# Patient Record
Sex: MALE | Race: ASIAN
[De-identification: names, ages, dates, MRNs, and addresses within clinical notes are randomized per-mention and may not be internally consistent; named-entity substitution may affect disease eponyms.]

---

## 2017-04-12 DIAGNOSIS — J45.20 COLD-INDUCED ASTHMA, MILD INTERMITTENT, UNCOMPLICATED: ICD-10-CM

## 2017-04-12 RX ORDER — ALBUTEROL SULFATE 90 UG/1
2 AEROSOL, METERED RESPIRATORY (INHALATION) EVERY 6 HOURS
Qty: 1 INHALER | Refills: 0 | Status: SHIPPED | OUTPATIENT
Start: 2017-04-12 | End: 2019-02-27 | Stop reason: SDUPTHER

## 2018-07-16 ENCOUNTER — HOSPITAL ENCOUNTER (EMERGENCY)
Dept: HOSPITAL 8 - ED | Age: 73
Discharge: HOME | End: 2018-07-16
Payer: MEDICARE

## 2018-07-16 VITALS — WEIGHT: 130.73 LBS | BODY MASS INDEX: 24.68 KG/M2 | HEIGHT: 61 IN

## 2018-07-16 VITALS — DIASTOLIC BLOOD PRESSURE: 78 MMHG | SYSTOLIC BLOOD PRESSURE: 183 MMHG

## 2018-07-16 DIAGNOSIS — M79.672: Primary | ICD-10-CM

## 2018-07-16 DIAGNOSIS — M10.072: ICD-10-CM

## 2018-07-16 PROCEDURE — 99283 EMERGENCY DEPT VISIT LOW MDM: CPT

## 2018-07-16 PROCEDURE — 96372 THER/PROPH/DIAG INJ SC/IM: CPT

## 2019-02-27 ENCOUNTER — OFFICE VISIT (OUTPATIENT)
Dept: INTERNAL MEDICINE | Facility: MEDICAL CENTER | Age: 74
End: 2019-02-27
Payer: COMMERCIAL

## 2019-02-27 VITALS
SYSTOLIC BLOOD PRESSURE: 151 MMHG | HEIGHT: 62 IN | BODY MASS INDEX: 25.43 KG/M2 | OXYGEN SATURATION: 95 % | HEART RATE: 86 BPM | DIASTOLIC BLOOD PRESSURE: 78 MMHG | TEMPERATURE: 99.5 F | WEIGHT: 138.2 LBS

## 2019-02-27 DIAGNOSIS — Z00.00 ROUTINE HEALTH MAINTENANCE: ICD-10-CM

## 2019-02-27 DIAGNOSIS — J45.20 COLD-INDUCED ASTHMA, MILD INTERMITTENT, UNCOMPLICATED: ICD-10-CM

## 2019-02-27 DIAGNOSIS — R73.02 IGT (IMPAIRED GLUCOSE TOLERANCE): ICD-10-CM

## 2019-02-27 DIAGNOSIS — M1A.9XX0 CHRONIC GOUT WITHOUT TOPHUS, UNSPECIFIED CAUSE, UNSPECIFIED SITE: ICD-10-CM

## 2019-02-27 DIAGNOSIS — R03.0 ELEVATED BP WITHOUT DIAGNOSIS OF HYPERTENSION: ICD-10-CM

## 2019-02-27 PROCEDURE — 99204 OFFICE O/P NEW MOD 45 MIN: CPT | Mod: GC | Performed by: INTERNAL MEDICINE

## 2019-02-27 RX ORDER — ALBUTEROL SULFATE 90 UG/1
2 AEROSOL, METERED RESPIRATORY (INHALATION) EVERY 6 HOURS
Qty: 1 INHALER | Refills: 3 | Status: SHIPPED | OUTPATIENT
Start: 2019-02-27

## 2019-02-27 RX ORDER — ALLOPURINOL 300 MG/1
300 TABLET ORAL
Qty: 180 TAB | Refills: 1 | Status: SHIPPED | OUTPATIENT
Start: 2019-02-27

## 2019-02-27 RX ORDER — INDOMETHACIN 50 MG/1
50 CAPSULE ORAL 3 TIMES DAILY PRN
Qty: 60 CAP | Refills: 1 | Status: SHIPPED | OUTPATIENT
Start: 2019-02-27

## 2019-02-27 ASSESSMENT — ENCOUNTER SYMPTOMS
SHORTNESS OF BREATH: 1
BLURRED VISION: 0
DOUBLE VISION: 0
SORE THROAT: 0
NAUSEA: 0
SPUTUM PRODUCTION: 0
HEADACHES: 0
WEAKNESS: 0
FOCAL WEAKNESS: 0
COUGH: 1
NERVOUS/ANXIOUS: 0
DIZZINESS: 0
FEVER: 0
DEPRESSION: 0
PALPITATIONS: 0
MYALGIAS: 0
VOMITING: 0
ABDOMINAL PAIN: 0
CHILLS: 0

## 2019-02-27 ASSESSMENT — PATIENT HEALTH QUESTIONNAIRE - PHQ9: CLINICAL INTERPRETATION OF PHQ2 SCORE: 0

## 2019-02-27 NOTE — PROGRESS NOTES
New Patient to Establish    Reason to establish: New patient to establish    CC:   Establish care, medications for gout and asthma      HPI:  73-year-old male with a past medical history of gout, asthma who presents today to establish care.  Patient does complain of nonproductive cough and associated shortness of breath.  Patient states that the symptoms have started since he has been out of his albuterol.  He has been out of his albuterol for approximately 4 months.  States he used to use albuterol 2-3 times a week for symptoms.  States that he currently wakes up at night 3-4 times a week coughing and short of breath.  Patient also complains of an acute gout flare in September 2018.  Patient states that he had extreme pain of his left great toe and visited [facility] emergency department.  States he was treated with indomethacin and allopurinol.  Patient has been taking allopurinol since 2010 for his gout and is currently almost on medication.  Patient is requesting extra refills as he is planning to go to [facility] in May.      Preventive care  Colonoscopy - 2011      Patient Active Problem List    Diagnosis Date Noted   • IGT (impaired glucose tolerance) 02/23/2015   • Gout, arthritis 11/26/2013   • Bronchospasm, acute        Past Medical History:   Diagnosis Date   • Bronchospasm, acute     Due to cold weather.  Rare albuterol use   • Cold-induced asthma    • GOUT 2003       Current Outpatient Prescriptions   Medication Sig Dispense Refill   • albuterol (VENTOLIN HFA) 108 (90 Base) MCG/ACT Aero Soln inhalation aerosol Inhale 2 Puffs by mouth every 6 hours. INHALE 2 PUFFS BY MOUTH EVERY 6 HOURS AS NEEDED FOR SHORTNESS OF BREATH. 1 Inhaler 3   • allopurinol (ZYLOPRIM) 300 MG Tab Take 1 Tab by mouth every day. 180 Tab 1   • indomethacin (INDOCIN) 50 MG Cap Take 1 Cap by mouth 3 times a day as needed. 60 Cap 1     No current facility-administered medications for this visit.        Allergies as of 02/27/2019   •  "(No Known Allergies)       Social History     Social History   • Marital status:      Spouse name: N/A   • Number of children: N/A   • Years of education: N/A     Occupational History   • Retired Other     Social History Main Topics   • Smoking status: Former Smoker     Packs/day: 0.25     Years: 40.00     Types: Cigarettes   • Smokeless tobacco: Former User     Quit date: 1/1/2010   • Alcohol use 0.5 oz/week     1 Cans of beer per week      Comment: social    • Drug use: No   • Sexual activity: Yes     Partners: Female     Other Topics Concern   • Not on file     Social History Narrative   • No narrative on file       Family History   Problem Relation Age of Onset   • Stroke Brother    • Hypertension Brother        History reviewed. No pertinent surgical history.    ROS: As per HPI. Additional pertinent symptoms as noted below.    Review of Systems   Constitutional: Negative for chills and fever.   HENT: Negative for congestion, hearing loss and sore throat.    Eyes: Negative for blurred vision and double vision.   Respiratory: Positive for cough and shortness of breath. Negative for sputum production.    Cardiovascular: Negative for chest pain, palpitations and leg swelling.   Gastrointestinal: Negative for abdominal pain, nausea and vomiting.   Genitourinary: Negative for dysuria and urgency.   Musculoskeletal: Negative for joint pain and myalgias.   Skin: Negative for itching and rash.   Neurological: Negative for dizziness, focal weakness, weakness and headaches.   Psychiatric/Behavioral: Negative for depression. The patient is not nervous/anxious.        Physical Exam    /78 (BP Location: Left arm, Patient Position: Sitting)   Pulse 86   Temp 37.5 °C (99.5 °F) (Temporal)   Ht 1.575 m (5' 2\")   Wt 62.7 kg (138 lb 3.2 oz)   SpO2 95%   BMI 25.28 kg/m²     Physical Exam   Constitutional: He is oriented to person, place, and time and well-developed, well-nourished, and in no distress.   HENT: "   Head: Normocephalic and atraumatic.   Mouth/Throat: No oropharyngeal exudate.   Eyes: Pupils are equal, round, and reactive to light. Conjunctivae are normal. No scleral icterus.   Neck: Normal range of motion. Neck supple. No JVD present. No thyromegaly present.   Cardiovascular: Normal rate and regular rhythm.    No murmur heard.  Pulmonary/Chest: Effort normal and breath sounds normal. No respiratory distress. He has no wheezes.   Abdominal: Soft. Bowel sounds are normal. He exhibits no distension. There is no tenderness. There is no rebound.   Musculoskeletal: Normal range of motion. He exhibits no edema.   Neurological: He is alert and oriented to person, place, and time. No cranial nerve deficit.   Skin: No rash noted. No erythema.   Psychiatric: Affect normal.         Assessment and Plan    1. Cold-induced asthma, mild intermittent  -Patient complains shortness of breath, nonproductive cough, nighttime awakenings previously controlled with albuterol  -Has been out of medication for 4 months with increasing symptoms  -We will refill medications at this time and assess for improvement/resolution of symptoms  - albuterol (VENTOLIN HFA) 108 (90 Base) MCG/ACT Aero Soln inhalation aerosol; Inhale 2 Puffs by mouth every 6 hours. INHALE 2 PUFFS BY MOUTH EVERY 6 HOURS AS NEEDED FOR SHORTNESS OF BREATH.  Dispense: 1 Inhaler; Refill: 3       2. Chronic gout without tophus, unspecified cause, unspecified site  -Patient complains of chronic gout, last flare in September 2018  -Uses allopurinol for prophylaxis, indomethacin for acute flare  -We will refill medications at this time and obtain serum uric acid  - URIC ACID, SERUM  - allopurinol (ZYLOPRIM) 300 MG Tab; Take 1 Tab by mouth every day. Dispense: 180 Tab  - indomethacin (INDOCIN) 50 MG Cap; Take 1 Cap by mouth 3 times a day as needed.       3. IGT (impaired glucose tolerance)  -History of impaired fasting glucose, no recent labs  -We will repeat fasting blood  glucose, A1c at this time  - HEMOGLOBIN A1C; Future      4. Elevated BP without diagnosis of hypertension  -Blood pressure 151/78 in clinic today, no history of hypertension  -We will continue to monitor and check blood pressures      5. Routine health maintenance  - CBC WITH DIFFERENTIAL; Future  - Comp Metabolic Panel; Future  - Lipid Profile; Future  - MICROALBUMIN CREAT RATIO URINE; Future  - URINALYSIS; Future  - TSH WITH REFLEX TO FT4; Future      Followup: Return in about 10 weeks (around 5/8/2019).      Signed by: Ksenia Keen M.D.

## 2019-02-27 NOTE — PATIENT INSTRUCTIONS
Asthma Prevention  Cigarette smoke, house dust, molds, pollens, animal dander, certain insects, exercise, and even cold air are all triggers that can cause an asthma attack. Often, no specific triggers are identified.   Take the following measures around your house to reduce attacks:  · Avoid cigarette and other smoke. No smoking should be allowed in a home where someone with asthma lives. If smoking is allowed indoors, it should be done in a room with a closed door, and a window should be opened to clear the air. If possible, do not use a wood-burning stove, kerosene heater, or fireplace. Minimize exposure to all sources of smoke, including incense, candles, fires, and fireworks.  · Decrease pollen exposure. Keep your windows shut and use central air during the pollen allergy season. Stay indoors with windows closed from late morning to afternoon, if you can. Avoid mowing the lawn if you have grass pollen allergy. Change your clothes and shower after being outside during this time of year.  · Remove molds from bathrooms and wet areas. Do this by cleaning the floors with a fungicide or diluted bleach. Avoid using humidifiers, vaporizers, or swamp coolers. These can spread molds through the air. Fix leaky faucets, pipes, or other sources of water that have mold around them.  · Decrease house dust exposure. Do this by using bare floors, vacuuming frequently, and changing furnace and air cooler filters frequently. Avoid using feather, wool, or foam bedding. Use polyester pillows and plastic covers over your mattress. Wash bedding weekly in hot water (hotter than 130° F).  · Try to get someone else to vacuum for you once or twice a week, if you can. Stay out of rooms while they are being vacuumed and for a short while afterward. If you vacuum, use a dust mask (from a hardware store), a double-layered or microfilter vacuum  bag, or a vacuum  with a HEPA filter.  · Avoid perfumes, talcum powder, hair spray,  paints and other strong odors and fumes.  · Keep warm-blooded pets (cats, dogs, rodents, birds) outside the home if they are triggers for asthma. If you can't keep the pet outdoors, keep the pet out of your bedroom and other sleeping areas at all times, and keep the door closed. Remove carpets and furniture covered with cloth from your home. If that is not possible, keep the pet away from fabric-covered furniture and carpets.  · Eliminate cockroaches. Keep food and garbage in closed containers. Never leave food out. Use poison baits, traps, powders, gels, or paste (for example, boric acid). If a spray is used to kill cockroaches, stay out of the room until the odor goes away.  · Decrease indoor humidity to less than 60%. Use an indoor air cleaning device.  · Avoid sulfites in foods and beverages. Do not drink beer or wine or eat dried fruit, processed potatoes, or shrimp if they cause asthma symptoms.  · Avoid cold air. Cover your nose and mouth with a scarf on cold or windy days.  · Avoid aspirin. This is the most common drug causing serious asthma attacks.  · If exercise triggers your asthma, ask your caregiver how you should prepare before exercising. (For example, ask if you could use your inhaler 10 minutes before exercising.)  · Avoid close contact with people who have a cold or the flu since your asthma symptoms may get worse if you catch the infection from them. Wash your hands thoroughly after touching items that may have been handled by others with a respiratory infection.  · Get a flu shot every year to protect against the flu virus, which often makes asthma worse for days to weeks. Also get a pneumonia shot once every five to 10 years.  Call your caregiver if you want further information about measures you can take to help prevent asthma attacks.  Document Released: 12/18/2006 Document Revised: 03/11/2013 Document Reviewed: 10/26/2010  [employer]Care® Patient Information ©2014 [employer].

## 2019-05-08 ENCOUNTER — OFFICE VISIT (OUTPATIENT)
Dept: INTERNAL MEDICINE | Facility: MEDICAL CENTER | Age: 74
End: 2019-05-08
Payer: COMMERCIAL

## 2019-05-08 VITALS
BODY MASS INDEX: 25.32 KG/M2 | HEART RATE: 80 BPM | OXYGEN SATURATION: 96 % | WEIGHT: 137.6 LBS | HEIGHT: 62 IN | TEMPERATURE: 99.5 F | SYSTOLIC BLOOD PRESSURE: 155 MMHG | DIASTOLIC BLOOD PRESSURE: 85 MMHG

## 2019-05-08 DIAGNOSIS — I10 ESSENTIAL HYPERTENSION: ICD-10-CM

## 2019-05-08 DIAGNOSIS — J45.20 MILD INTERMITTENT ASTHMA WITHOUT COMPLICATION: ICD-10-CM

## 2019-05-08 DIAGNOSIS — R73.03 PREDIABETES: ICD-10-CM

## 2019-05-08 DIAGNOSIS — Z23 NEED FOR VACCINATION AGAINST STREPTOCOCCUS PNEUMONIAE USING PNEUMOCOCCAL CONJUGATE VACCINE 13: ICD-10-CM

## 2019-05-08 PROCEDURE — 99213 OFFICE O/P EST LOW 20 MIN: CPT | Mod: GE,25 | Performed by: INTERNAL MEDICINE

## 2019-05-08 PROCEDURE — 90471 IMMUNIZATION ADMIN: CPT | Performed by: INTERNAL MEDICINE

## 2019-05-08 PROCEDURE — 90670 PCV13 VACCINE IM: CPT | Performed by: INTERNAL MEDICINE

## 2019-05-08 RX ORDER — LOSARTAN POTASSIUM 25 MG/1
25 TABLET ORAL DAILY
Qty: 90 TAB | Refills: 1 | Status: SHIPPED | OUTPATIENT
Start: 2019-05-08

## 2019-05-08 ASSESSMENT — ENCOUNTER SYMPTOMS
NAUSEA: 0
ABDOMINAL PAIN: 0
DOUBLE VISION: 0
DEPRESSION: 0
DIZZINESS: 0
VOMITING: 0
CHILLS: 0
BLURRED VISION: 0
WHEEZING: 0
MYALGIAS: 0
WEAKNESS: 0
FEVER: 0
HEADACHES: 0
SORE THROAT: 0
COUGH: 0
SHORTNESS OF BREATH: 0
PND: 0

## 2019-05-08 ASSESSMENT — LIFESTYLE VARIABLES: SUBSTANCE_ABUSE: 0

## 2019-05-08 NOTE — NON-PROVIDER
"Octaviano Latif is a 73 y.o. male here for a non-provider visit for:   PREVNAR (PCV13) 1 of 1    Reason for immunization: Overdue/Provider Recommended  Immunization records indicate need for vaccine: Yes, confirmed with Epic  Minimum interval has been met for this vaccine: Yes  ABN completed: Not Indicated    Order and dose verified by: Shira ROMERO Dated  11/5/15 was given to patient: Yes  All IAC Questionnaire questions were answered \"No.\"    Patient tolerated injection and no adverse effects were observed or reported: Yes    Pt scheduled for next dose in series: Not Indicated  "

## 2019-05-08 NOTE — PROGRESS NOTES
Established Patient    Octaviano presents today with the following:    CC:   Follow-up for labs, pneumonia vaccine      HPI:   73-year-old male past medical history of recently diagnosed prediabetes (last A1c 6.2 in 03/2019), asthma presents today for a follow-up.  Patient states he is doing well since last visit with no acute complaints.  Denies shortness of breath or asthma exacerbations.  States he uses albuterol 1-2 times a week.  Patient has not checked his blood pressure since last visit but obtained a blood pressure cuff.  Denies any symptoms including headaches, dizziness, shortness of breath, chest pain with exertion.  Patient does plan to go to [facility] in a few weeks for 3 to 4 months.  He is also requesting a pneumonia vaccine at this time.      Patient Active Problem List    Diagnosis Date Noted   • Essential hypertension 05/08/2019   • Mild intermittent asthma without complication 05/08/2019   • Prediabetes 02/23/2015   • Gout, arthritis 11/26/2013       Current Outpatient Prescriptions   Medication Sig Dispense Refill   • allopurinol (ZYLOPRIM) 300 MG Tab Take 1 Tab by mouth every day. 180 Tab 1   • indomethacin (INDOCIN) 50 MG Cap Take 1 Cap by mouth 3 times a day as needed. 60 Cap 1   • albuterol (VENTOLIN HFA) 108 (90 Base) MCG/ACT Aero Soln inhalation aerosol Inhale 2 Puffs by mouth every 6 hours. INHALE 2 PUFFS BY MOUTH EVERY 6 HOURS AS NEEDED FOR SHORTNESS OF BREATH. 1 Inhaler 3     No current facility-administered medications for this visit.        ROS: As per HPI. Additional pertinent symptoms as noted below.  Review of Systems   Constitutional: Negative for chills and fever.   HENT: Negative for hearing loss, sore throat and tinnitus.    Eyes: Negative for blurred vision and double vision.   Respiratory: Negative for cough, shortness of breath and wheezing.    Cardiovascular: Negative for chest pain, leg swelling and PND.   Gastrointestinal: Negative for abdominal pain, nausea and vomiting.  "  Genitourinary: Negative for dysuria and urgency.   Musculoskeletal: Negative for joint pain and myalgias.   Skin: Negative for itching and rash.   Neurological: Negative for dizziness, weakness and headaches.   Psychiatric/Behavioral: Negative for depression and substance abuse.         Physical Exam    /85 (BP Location: Left arm, Patient Position: Sitting)   Pulse 80   Temp 37.5 °C (99.5 °F) (Temporal)   Ht 1.575 m (5' 2\")   Wt 62.4 kg (137 lb 9.6 oz)   SpO2 96%   BMI 25.17 kg/m²     Physical Exam   Constitutional: He is oriented to person, place, and time and well-developed, well-nourished, and in no distress.   HENT:   Head: Normocephalic and atraumatic.   Mouth/Throat: No oropharyngeal exudate.   Eyes: Pupils are equal, round, and reactive to light. Conjunctivae are normal. No scleral icterus.   Neck: Normal range of motion. Neck supple. No JVD present. No thyromegaly present.   Cardiovascular: Normal rate, regular rhythm and normal heart sounds.    Pulmonary/Chest: Effort normal and breath sounds normal. No respiratory distress. He has no wheezes. He has no rales.   Abdominal: Soft. Bowel sounds are normal. He exhibits no distension. There is no tenderness. There is no rebound.   Musculoskeletal: Normal range of motion. He exhibits no edema.   Neurological: He is alert and oriented to person, place, and time. No cranial nerve deficit.   Skin: Skin is dry. No rash noted. No erythema.   Psychiatric: Affect normal.         Assessment and Plan    1. Prediabetes  -History of recently diagnosed prediabetes, stable, not on any medications  -A1c of 6.2 in February 2019  -Educated patient regarding lifestyle and dietary modification, avoidance of sodas, reduction of sugar in tea, avoiding sweets  -We will hold pharmacotherapy at this time and check A1c again when returns from the [facility]      2. Essential hypertension  -Blood pressure of 155/82 in clinic today, elevated at previous visit  -Does not " check blood pressure at home  -Discussed with patient, patient amenable to starting pharmacotherapy at this time, will start ARB  - losartan (COZAAR) 25 MG Tab; Take 1 Tab by mouth every day.  Dispense: 90 Tab; Refill: 1      3. Mild intermittent asthma without complication  -Stable  -Continue albuterol as needed      4. Need for vaccination against Streptococcus pneumoniae using pneumococcal conjugate vaccine 13  -Has not received previous pneumonia vaccine  - Prevnar 13 PCV-13      Follow up: Return in about 5 months (around 10/8/2019).      Signed by: Ksenia Keen M.D.

## 2019-05-08 NOTE — PATIENT INSTRUCTIONS
"How to Take Your Blood Pressure  HOW DO I GET A BLOOD PRESSURE MACHINE?  · You can buy an electronic home blood pressure machine at your local pharmacy. Insurance will sometimes cover the cost if you have a prescription.  · Ask your doctor what type of machine is best for you. There are different machines for your arm and your wrist.  · If you decide to buy a machine to check your blood pressure on your arm, first check the size of your arm so you can buy the right size cuff. To check the size of your arm:    ¨ Use a measuring tape that shows both inches and centimeters.    ¨ Wrap the measuring tape around the upper-middle part of your arm. You may need someone to help you measure.    ¨ Write down your arm measurement in both inches and centimeters.    · To measure your blood pressure correctly, it is important to have the right size cuff.    ¨ If your arm is up to 13 inches (up to 34 centimeters), get an adult cuff size.  ¨ If your arm is 13 to 17 inches (35 to 44 centimeters), get a large adult cuff size.    ¨  If your arm is 17 to 20 inches (45 to 52 centimeters), get an adult thigh cuff.    WHAT DO THE NUMBERS MEAN?   · There are two numbers that make up your blood pressure. For example: 120/80.  ¨ The first number (120 in our example) is called the \"systolic pressure.\" It is a measure of the pressure in your blood vessels when your heart is pumping blood.  ¨ The second number (80 in our example) is called the \"diastolic pressure.\" It is a measure of the pressure in your blood vessels when your heart is resting between beats.  · Your doctor will tell you what your blood pressure should be.  WHAT SHOULD I DO BEFORE I CHECK MY BLOOD PRESSURE?   · Try to rest or relax for at least 30 minutes before you check your blood pressure.  · Do not smoke.  · Do not have any drinks with caffeine, such as:  ¨ Soda.  ¨ Coffee.  ¨ Tea.  · Check your blood pressure in a quiet room.  · Sit down and stretch out your arm on a table. " Keep your arm at about the level of your heart. Let your arm relax.  · Make sure that your legs are not crossed.  HOW DO I CHECK MY BLOOD PRESSURE?  · Follow the directions that came with your machine.  · Make sure you remove any tight-fitting clothing from your arm or wrist. Wrap the cuff around your upper arm or wrist. You should be able to fit a finger between the cuff and your arm. If you cannot fit a finger between the cuff and your arm, it is too tight and should be removed and rewrapped.  · Some units require you to manually pump up the arm cuff.  · Automatic units inflate the cuff when you press a button.  · Cuff deflation is automatic in both models.  · After the cuff is inflated, the unit measures your blood pressure and pulse. The readings are shown on a monitor. Hold still and breathe normally while the cuff is inflated.  · Getting a reading takes less than a minute.  · Some models store readings in a memory. Some provide a printout of readings. If your machine does not store your readings, keep a written record.  · Take readings with you to your next visit with your doctor.  This information is not intended to replace advice given to you by your health care provider. Make sure you discuss any questions you have with your health care provider.  Document Released: 11/30/2009 Document Revised: 01/08/2016 Document Reviewed: 02/12/2015  [employer] Interactive Patient Education © 2017 [employer]

## 2021-01-15 DIAGNOSIS — Z23 NEED FOR VACCINATION: ICD-10-CM
